# Patient Record
Sex: FEMALE | URBAN - METROPOLITAN AREA
[De-identification: names, ages, dates, MRNs, and addresses within clinical notes are randomized per-mention and may not be internally consistent; named-entity substitution may affect disease eponyms.]

---

## 2021-04-13 ENCOUNTER — OFFICE VISIT CONVERTED (OUTPATIENT)
Dept: SURGERY | Facility: CLINIC | Age: 41
End: 2021-04-13
Attending: SURGERY

## 2021-04-13 ENCOUNTER — CONVERSION ENCOUNTER (OUTPATIENT)
Dept: SURGERY | Facility: CLINIC | Age: 41
End: 2021-04-13

## 2021-04-20 ENCOUNTER — CONVERSION ENCOUNTER (OUTPATIENT)
Dept: SURGERY | Facility: CLINIC | Age: 41
End: 2021-04-20

## 2021-04-20 ENCOUNTER — OFFICE VISIT CONVERTED (OUTPATIENT)
Dept: SURGERY | Facility: CLINIC | Age: 41
End: 2021-04-20
Attending: SURGERY

## 2021-04-30 ENCOUNTER — HOSPITAL ENCOUNTER (OUTPATIENT)
Dept: MRI IMAGING | Facility: HOSPITAL | Age: 41
Discharge: HOME | End: 2021-04-30
Attending: SURGERY

## 2021-05-04 ENCOUNTER — OFFICE VISIT CONVERTED (OUTPATIENT)
Dept: SURGERY | Facility: CLINIC | Age: 41
End: 2021-05-04
Attending: SURGERY

## 2021-05-11 NOTE — H&P
"   History and Physical      Patient Name: Melody Xavier   Patient ID: [id number]   Sex: Female   YOB: 1980    Referring Provider: CHARISSA HAMMER MD    Visit Date: April 13, 2021    Provider: Zachariah Archer MD   Location: [facility]   Location Address: [address] [place] [address]  [id number]   Location Phone: [phone]          Chief Complaint  · Outpatient History & Physical / Surgical Orders  · Hernia Consult      History Of Present Illness     Ms. Xavier is a 40-year-old female who presents with pain around her umbilicus. She had a previous umbilical hernia repair in [place] and subsequently had recurrence and then underwent repair in [place], at which time it appears she had mesh placed.       Past Medical History  ***No Significant Medical History         Past Surgical History  Hernia         Medication List  diclofenac sodium 75 mg oral tablet,delayed release (DR/EC); dicyclomine 10 mg oral capsule; ondansetron 4 mg oral tablet,disintegrating         Allergy List  NO KNOWN DRUG ALLERGIES         Family Medical History  *No Known Family History         Social History  Tobacco (Current every day)         Review of Systems  · Cardiovascular  o Denies  o : chest pain on exertion, shortness of breath, lower extremity swelling  · Respiratory  o Denies  o : wheezing, chronic cough, coughing up blood  · Gastrointestinal  o Denies  o : diarrhea, chronic abdominal pain, reflux symptoms      Vitals  Date Time BP Position Site L\R Cuff Size HR RR TEMP (F) WT  HT  BMI kg/m2 BSA m2 O2 Sat FR L/min FiO2 HC       04/13/2021 11:01 AM       14  121lbs 2oz 5'  4\" 20.79 1.58             Physical Examination     Examination today reveals no evidence of a hernia. There is a hard nodular area near the periumbilical area, which appears to be related to previous mesh placement. CT scan, which was obtained on April 4th at [facility] and showed a 2.5 cm hyperdense density in right periumbilical " area. This likely represents the mesh. There is no evidence of incarceration.           Assessment  · Pre-op testing     V72.84/Z01.818      Plan  · Medications  o Medications have been Reconciled  o Transition of Care or Provider Policy  · Instructions  o PLEASE SIGN PERMIT FOR:  o Patient to make f/u appt after she gets her previous op notes  · Referrals  o ID: [id number] Date: 04/09/2021 Type: Inbound  Specialty: General Surgery     I have suggested to the patient that she obtain her previous operative reports and have stressed to her that this would be a difficult surgery. To remove the mesh would necessitate removing part of the abdominal wall. She understands this would have a considerable risk of developing another hernia. She will follow-up after obtaining her op report.             Electronically Signed by: Michela Murray-, -Author on April 15, 2021 02:58:48 PM  Electronically Co-signed by: Zachariah Archer MD -Reviewer on April 19, 2021 10:42:22 AM

## 2021-05-14 ENCOUNTER — HOSPITAL ENCOUNTER (OUTPATIENT)
Dept: PREADMISSION TESTING | Facility: HOSPITAL | Age: 41
Discharge: HOME | End: 2021-05-14
Attending: SURGERY

## 2021-05-14 VITALS — WEIGHT: 121.12 LBS | HEIGHT: 64 IN | RESPIRATION RATE: 14 BRPM | BODY MASS INDEX: 20.68 KG/M2

## 2021-05-14 VITALS — HEIGHT: 64 IN | RESPIRATION RATE: 14 BRPM | BODY MASS INDEX: 20.49 KG/M2 | WEIGHT: 120 LBS

## 2021-05-14 NOTE — PROGRESS NOTES
"   Progress Note      Patient Name: Melody Xavier   Patient ID: [id number]   Sex: Female   YOB: 1980    Primary Care Provider: CHARISSA HAMMER MD   Referring Provider: CHARISSA HAMMER MD    Visit Date: April 20, 2021    Provider: Zachariah Archer MD   Location: [facility]   Location Address: [address] [place] [address]  [id number]   Location Phone: [phone]          Chief Complaint  · Follow Up Office Visit      History Of Present Illness     Ms. Xavier is seen for follow-up of her umbilical pain. She has had two umbilical hernias. Her previous op reports were reviewed and there does not appear to be any mesh present. The hard nodular area could represent a granuloma.       Past Medical History  ***No Significant Medical History         Past Surgical History  Hernia         Medication List  diclofenac sodium 75 mg oral tablet,delayed release (DR/EC); dicyclomine 10 mg oral capsule; ondansetron 4 mg oral tablet,disintegrating         Allergy List  NO KNOWN DRUG ALLERGIES       Allergies Reconciled  Family Medical History  *No Known Family History         Social History  Tobacco (Current every day)         Review of Systems  · Cardiovascular  o Denies  o : chest pain on exertion, shortness of breath, lower extremity swelling  · Respiratory  o Denies  o : wheezing, chronic cough, coughing up blood  · Gastrointestinal  o Denies  o : diarrhea, chronic abdominal pain, reflux symptoms      Vitals  Date Time BP Position Site L\R Cuff Size HR RR TEMP (F) WT  HT  BMI kg/m2 BSA m2 O2 Sat FR L/min FiO2 HC       04/20/2021 10:00 AM       14  120lbs 0oz 5'  4\" 20.6 1.57                 Assessment  · Abdominal wall mass     789.30/R22.2      Plan  · Orders  o MRI abdomen (14038) - 789.30/R22.2 - 04/30/2021   abdominal wall  · Medications  o Medications have been Reconciled  o Transition of Care or Provider Policy  · Instructions  o Follow up after MRI  o Electronically Identified Patient Education " Materials Provided Electronically  · Referrals  o ID: [id number] Date: 04/09/2021 Type: Inbound  Specialty: General Surgery     I will plan an MRI of her abdominal wall. This will be performed on 04/30/21. She will follow-up.             Electronically Signed by: Michela Murray-, -Author on April 23, 2021 10:27:23 AM  Electronically Co-signed by: Zachariah Archer MD -Reviewer on April 29, 2021 10:48:40 AM

## 2021-05-15 LAB — SARS-COV-2 RNA SPEC QL NAA+PROBE: NOT DETECTED

## 2021-05-20 ENCOUNTER — HOSPITAL ENCOUNTER (OUTPATIENT)
Dept: PERIOP | Facility: HOSPITAL | Age: 41
Setting detail: HOSPITAL OUTPATIENT SURGERY
Discharge: HOME | End: 2021-05-20
Attending: SURGERY

## 2021-05-20 LAB — HCG UR QL: NEGATIVE

## 2021-05-27 ENCOUNTER — OFFICE VISIT CONVERTED (OUTPATIENT)
Dept: SURGERY | Facility: CLINIC | Age: 41
End: 2021-05-27
Attending: SURGERY

## 2021-06-03 NOTE — PROCEDURES
Patient: DELIA MARI     Acct: [id number]     Report: #[id number]  MR #:  [id number]     DOS: 2021     : 1980  DICTATING: Zachariah Archer  ***Signed***  --------------------------------------------------------------------------------------------------------------------  Post Procedure/Operative Note      Date       21            Pre-Operative Diagnosis:      abdominal wall mass            Post-Operative Diagnosis:      Same as pre-op diagnosis            Surgeon/Assistants      Surgeon      Gianni            Anesthesia      General            Procedure Performed/Technique      Umbilical hernia repair/      excision of abdominal wall granuloma            Specimen/Tissue Removed:            abdominal wall mass            Findings:            umbilical hernia w/ suture granuloma            Complications:      No            Estimated Blood Loss:      2 cc            Zachariah Archer                      May 20, 2021 09:18      Electronically signed by Zachariah Archer  2021 09:19     Disclaimer: Converted hospital document may not contain table formatting or lab diagrams. Please see [email or web address] for authenticated document.

## 2021-06-06 NOTE — PROGRESS NOTES
"   Progress Note      Patient Name: Melody Xavier   Patient ID: [id number]   Sex: Female   YOB: 1980    Primary Care Provider: CHARISSA HAMMER MD   Referring Provider: CHARISSA HAMMER MD    Visit Date: May 27, 2021    Provider: Zachariah Archer MD   [facility]   Location Address: [address] [place] [address]  [id number]   Location Phone: [phone]          Chief Complaint  · Follow Up Surgery      History Of Present Illness     Ms. Xavier is seen in follow-up status post umbilical hernia repair and removal of an abdominal wall mass, which returned as endometriosis.       Past Medical History  ***No Significant Medical History         Past Surgical History  Hernia; Lipoma Removal         Medication List  diclofenac sodium 75 mg oral tablet,delayed release (DR/EC); dicyclomine 10 mg oral capsule; ondansetron 4 mg oral tablet,disintegrating         Allergy List  NO KNOWN DRUG ALLERGIES         Family Medical History  *No Known Family History         Social History  Tobacco (Current every day)         Review of Systems  · Cardiovascular  o Denies  o : chest pain on exertion, shortness of breath, lower extremity swelling  · Respiratory  o Denies  o : wheezing, chronic cough, coughing up blood  · Gastrointestinal  o Denies  o : diarrhea, chronic abdominal pain, reflux symptoms      Vitals  Date Time BP Position Site L\R Cuff Size HR RR TEMP (F) WT  HT  BMI kg/m2 BSA m2 O2 Sat FR L/min FiO2 HC       05/27/2021 12:56 PM       14  105lbs 6oz 5'  4\" 18.09 1.47             Physical Examination     Her incision is healing. Her clips were removed.           Assessment  · Encounter for examination following surgery     V67.00/Z09      Plan  · Medications  o Medications have been Reconciled  o Transition of Care or Provider Policy  · Instructions  o F/U PRN  o Electronically Identified Patient Education Materials Provided Electronically            Electronically Signed by: Michela" Trevor-, -Author on May 28, 2021 02:08:35 PM  Electronically Co-signed by: Zachariah Archer MD -Reviewer on Cira 3, 2021 03:13:49 PM

## 2021-07-15 VITALS — RESPIRATION RATE: 14 BRPM | BODY MASS INDEX: 17.99 KG/M2 | HEIGHT: 64 IN | WEIGHT: 105.37 LBS

## 2021-07-15 VITALS — WEIGHT: 113.25 LBS | HEIGHT: 64 IN | BODY MASS INDEX: 19.33 KG/M2
